# Patient Record
Sex: FEMALE | Race: WHITE | HISPANIC OR LATINO
[De-identification: names, ages, dates, MRNs, and addresses within clinical notes are randomized per-mention and may not be internally consistent; named-entity substitution may affect disease eponyms.]

---

## 2021-03-04 ENCOUNTER — APPOINTMENT (OUTPATIENT)
Dept: ULTRASOUND IMAGING | Facility: CLINIC | Age: 41
End: 2021-03-04
Payer: COMMERCIAL

## 2021-03-04 PROCEDURE — 76830 TRANSVAGINAL US NON-OB: CPT

## 2021-03-04 PROCEDURE — 76856 US EXAM PELVIC COMPLETE: CPT

## 2021-04-12 ENCOUNTER — NON-APPOINTMENT (OUTPATIENT)
Age: 41
End: 2021-04-12

## 2021-04-12 ENCOUNTER — APPOINTMENT (OUTPATIENT)
Dept: OBGYN | Facility: CLINIC | Age: 41
End: 2021-04-12
Payer: COMMERCIAL

## 2021-04-12 VITALS
DIASTOLIC BLOOD PRESSURE: 80 MMHG | HEIGHT: 60 IN | BODY MASS INDEX: 33.38 KG/M2 | WEIGHT: 170 LBS | SYSTOLIC BLOOD PRESSURE: 120 MMHG

## 2021-04-12 DIAGNOSIS — Z83.3 FAMILY HISTORY OF DIABETES MELLITUS: ICD-10-CM

## 2021-04-12 DIAGNOSIS — Z78.9 OTHER SPECIFIED HEALTH STATUS: ICD-10-CM

## 2021-04-12 DIAGNOSIS — Z86.39 PERSONAL HISTORY OF OTHER ENDOCRINE, NUTRITIONAL AND METABOLIC DISEASE: ICD-10-CM

## 2021-04-12 DIAGNOSIS — Z87.42 PERSONAL HISTORY OF OTHER DISEASES OF THE FEMALE GENITAL TRACT: ICD-10-CM

## 2021-04-12 DIAGNOSIS — Z82.3 FAMILY HISTORY OF STROKE: ICD-10-CM

## 2021-04-12 PROCEDURE — 99202 OFFICE O/P NEW SF 15 MIN: CPT

## 2021-04-12 PROCEDURE — 99072 ADDL SUPL MATRL&STAF TM PHE: CPT

## 2021-04-12 NOTE — HISTORY OF PRESENT ILLNESS
[FreeTextEntry1] : 42yo  with 2 months of vag bleeding. She has always had irreg periods since menarche. She was seen at [facility] and had an endo bx but does not have the results. She also believes she had a pap as well.  She had a sono at [facility] which demonstrates a 2cm R paraovarian cyst, as well as 15mm EMS.  She has not been soaking through pads.  She believes that she had a labs done by her PCP as well but is not sure if thyroid testing was done. \par \par

## 2021-04-12 NOTE — DISCUSSION/SUMMARY
[FreeTextEntry1] : 42yo  with AUB-? here for evaluation. s/p Endo bx and sono with thickened EMS. ? Polyp \par \par - Pt to RTO for 2wks for results review and possible EndoSee \par - GC/CT and BD affirm done to r/o STI \par - Bleeding precautions reviewed \par \par Joaquina Farrell MD \par Obstetrician/Gynecologist\par

## 2021-04-14 LAB
C TRACH RRNA SPEC QL NAA+PROBE: NOT DETECTED
N GONORRHOEA RRNA SPEC QL NAA+PROBE: NOT DETECTED
SOURCE AMPLIFICATION: NORMAL

## 2021-04-21 ENCOUNTER — APPOINTMENT (OUTPATIENT)
Age: 41
End: 2021-04-21
Payer: COMMERCIAL

## 2021-04-21 PROCEDURE — 77063 BREAST TOMOSYNTHESIS BI: CPT

## 2021-04-21 PROCEDURE — 77067 SCR MAMMO BI INCL CAD: CPT

## 2021-04-22 ENCOUNTER — NON-APPOINTMENT (OUTPATIENT)
Age: 41
End: 2021-04-22

## 2021-04-22 DIAGNOSIS — N76.0 ACUTE VAGINITIS: ICD-10-CM

## 2021-04-22 DIAGNOSIS — B96.89 ACUTE VAGINITIS: ICD-10-CM

## 2021-04-23 LAB
CANDIDA VAG CYTO: NOT DETECTED
G VAGINALIS+PREV SP MTYP VAG QL MICRO: DETECTED
T VAGINALIS VAG QL WET PREP: NOT DETECTED

## 2021-04-30 ENCOUNTER — APPOINTMENT (OUTPATIENT)
Dept: ULTRASOUND IMAGING | Facility: CLINIC | Age: 41
End: 2021-04-30
Payer: COMMERCIAL

## 2021-04-30 PROCEDURE — 76641 ULTRASOUND BREAST COMPLETE: CPT | Mod: 50

## 2021-05-03 ENCOUNTER — APPOINTMENT (OUTPATIENT)
Dept: OBGYN | Facility: CLINIC | Age: 41
End: 2021-05-03

## 2021-05-07 ENCOUNTER — APPOINTMENT (OUTPATIENT)
Dept: ULTRASOUND IMAGING | Facility: CLINIC | Age: 41
End: 2021-05-07
Payer: MEDICAID

## 2021-05-07 PROCEDURE — 76642 ULTRASOUND BREAST LIMITED: CPT | Mod: LT

## 2021-05-14 ENCOUNTER — APPOINTMENT (OUTPATIENT)
Dept: OBGYN | Facility: CLINIC | Age: 41
End: 2021-05-14
Payer: MEDICAID

## 2021-05-14 ENCOUNTER — ASOB RESULT (OUTPATIENT)
Age: 41
End: 2021-05-14

## 2021-05-14 VITALS
BODY MASS INDEX: 33.38 KG/M2 | HEIGHT: 60 IN | SYSTOLIC BLOOD PRESSURE: 112 MMHG | WEIGHT: 170 LBS | DIASTOLIC BLOOD PRESSURE: 70 MMHG

## 2021-05-14 VITALS — TEMPERATURE: 97.7 F

## 2021-05-14 DIAGNOSIS — N93.9 ABNORMAL UTERINE AND VAGINAL BLEEDING, UNSPECIFIED: ICD-10-CM

## 2021-05-14 LAB
HCG UR QL: NEGATIVE
QUALITY CONTROL: YES

## 2021-05-14 PROCEDURE — 99072 ADDL SUPL MATRL&STAF TM PHE: CPT

## 2021-05-14 PROCEDURE — 81025 URINE PREGNANCY TEST: CPT

## 2021-05-14 PROCEDURE — 58340 CATHETER FOR HYSTEROGRAPHY: CPT

## 2021-05-14 PROCEDURE — 76830 TRANSVAGINAL US NON-OB: CPT

## 2021-05-14 PROCEDURE — 76856 US EXAM PELVIC COMPLETE: CPT | Mod: 59

## 2021-05-25 LAB
BASOPHILS # BLD AUTO: 0.02 K/UL
BASOPHILS NFR BLD AUTO: 0.5 %
EOSINOPHIL # BLD AUTO: 0.07 K/UL
EOSINOPHIL NFR BLD AUTO: 1.7 %
HCT VFR BLD CALC: 35 %
HGB BLD-MCNC: 11.2 G/DL
IMM GRANULOCYTES NFR BLD AUTO: 0 %
LYMPHOCYTES # BLD AUTO: 1.73 K/UL
LYMPHOCYTES NFR BLD AUTO: 40.8 %
MAN DIFF?: NORMAL
MCHC RBC-ENTMCNC: 30.4 PG
MCHC RBC-ENTMCNC: 32 GM/DL
MCV RBC AUTO: 94.9 FL
MONOCYTES # BLD AUTO: 0.33 K/UL
MONOCYTES NFR BLD AUTO: 7.8 %
NEUTROPHILS # BLD AUTO: 2.09 K/UL
NEUTROPHILS NFR BLD AUTO: 49.2 %
PLATELET # BLD AUTO: 251 K/UL
RBC # BLD: 3.69 M/UL
RBC # FLD: 13.5 %
TSH SERPL-ACNC: 1.41 UIU/ML
WBC # FLD AUTO: 4.24 K/UL

## 2021-06-18 ENCOUNTER — OUTPATIENT (OUTPATIENT)
Dept: OUTPATIENT SERVICES | Facility: HOSPITAL | Age: 41
LOS: 1 days | End: 2021-06-18

## 2021-06-25 ENCOUNTER — APPOINTMENT (OUTPATIENT)
Dept: OBGYN | Facility: HOSPITAL | Age: 41
End: 2021-06-25

## 2021-06-25 ENCOUNTER — OUTPATIENT (OUTPATIENT)
Dept: OUTPATIENT SERVICES | Facility: HOSPITAL | Age: 41
LOS: 1 days | End: 2021-06-25
Payer: COMMERCIAL

## 2021-06-25 PROCEDURE — 58558 HYSTEROSCOPY BIOPSY: CPT

## 2021-07-16 ENCOUNTER — APPOINTMENT (OUTPATIENT)
Dept: OBGYN | Facility: CLINIC | Age: 41
End: 2021-07-16
Payer: MEDICAID

## 2021-07-16 VITALS
WEIGHT: 170 LBS | HEIGHT: 60 IN | SYSTOLIC BLOOD PRESSURE: 108 MMHG | BODY MASS INDEX: 33.38 KG/M2 | DIASTOLIC BLOOD PRESSURE: 66 MMHG

## 2021-07-16 VITALS — TEMPERATURE: 97.8 F

## 2021-07-16 PROCEDURE — 99212 OFFICE O/P EST SF 10 MIN: CPT

## 2021-07-20 ENCOUNTER — APPOINTMENT (OUTPATIENT)
Dept: DISASTER EMERGENCY | Facility: CLINIC | Age: 41
End: 2021-07-20

## 2021-10-13 ENCOUNTER — NON-APPOINTMENT (OUTPATIENT)
Age: 41
End: 2021-10-13

## 2021-10-13 NOTE — HISTORY OF PRESENT ILLNESS
[FreeTextEntry1] : 42yo  s/p polypectomy is here for post op visit. Pt is doing well post op and has no complaints. Her path is benign and was reviewed with her today.  She has had no bleeding since her procedure. \par \par  \par

## 2021-10-13 NOTE — DISCUSSION/SUMMARY
[FreeTextEntry1] : 42yo  s/p uncomplicated polypectomy, D&C with benign path and normal exam. \par \par RTO in 3 months if no menses \par Will attempt again to obtain pap results from [facility]\par Pt ok to resume activities\par All questions solicited and answered \par \par \par Joaquina Farrell MD \par Obstetrician/Gynecologist\par

## 2021-10-15 ENCOUNTER — APPOINTMENT (OUTPATIENT)
Dept: OBGYN | Facility: CLINIC | Age: 41
End: 2021-10-15
Payer: MEDICAID

## 2021-10-15 VITALS — TEMPERATURE: 97.1 F

## 2021-10-15 VITALS
SYSTOLIC BLOOD PRESSURE: 112 MMHG | HEIGHT: 60 IN | WEIGHT: 167 LBS | DIASTOLIC BLOOD PRESSURE: 82 MMHG | BODY MASS INDEX: 32.79 KG/M2

## 2021-10-15 PROCEDURE — 57500 BIOPSY OF CERVIX: CPT

## 2021-10-15 NOTE — PHYSICAL EXAM
[Normal] : uterus [No Bleeding] : there was no active vaginal bleeding [Polyp ___ cm] : had a [unfilled] ~Ucm polyp [Uterine Adnexae] : were not tender and not enlarged

## 2021-10-29 ENCOUNTER — APPOINTMENT (OUTPATIENT)
Dept: OBGYN | Facility: CLINIC | Age: 41
End: 2021-10-29

## 2021-11-01 ENCOUNTER — APPOINTMENT (OUTPATIENT)
Dept: OBGYN | Facility: CLINIC | Age: 41
End: 2021-11-01
Payer: MEDICAID

## 2021-11-01 VITALS
DIASTOLIC BLOOD PRESSURE: 74 MMHG | SYSTOLIC BLOOD PRESSURE: 114 MMHG | HEIGHT: 60 IN | WEIGHT: 178 LBS | BODY MASS INDEX: 34.95 KG/M2

## 2021-11-01 LAB
C TRACH RRNA SPEC QL NAA+PROBE: NOT DETECTED
CANDIDA VAG CYTO: NOT DETECTED
CORE LAB BIOPSY: NORMAL
G VAGINALIS+PREV SP MTYP VAG QL MICRO: NOT DETECTED
N GONORRHOEA RRNA SPEC QL NAA+PROBE: NOT DETECTED
SOURCE AMPLIFICATION: NORMAL
T VAGINALIS VAG QL WET PREP: NOT DETECTED

## 2021-11-01 PROCEDURE — 99212 OFFICE O/P EST SF 10 MIN: CPT

## 2021-11-01 NOTE — PHYSICAL EXAM
[Labia Majora] : normal [Labia Minora] : normal [Scant] : There was scant vaginal bleeding [Normal] : normal

## 2021-12-03 ENCOUNTER — APPOINTMENT (OUTPATIENT)
Dept: OBGYN | Facility: CLINIC | Age: 41
End: 2021-12-03
Payer: MEDICAID

## 2021-12-03 ENCOUNTER — ASOB RESULT (OUTPATIENT)
Age: 41
End: 2021-12-03

## 2021-12-03 VITALS
HEIGHT: 60 IN | DIASTOLIC BLOOD PRESSURE: 62 MMHG | SYSTOLIC BLOOD PRESSURE: 102 MMHG | BODY MASS INDEX: 32.59 KG/M2 | WEIGHT: 166 LBS

## 2021-12-03 LAB
HCG UR QL: NEGATIVE
QUALITY CONTROL: YES

## 2021-12-03 PROCEDURE — 76856 US EXAM PELVIC COMPLETE: CPT | Mod: 59

## 2021-12-03 PROCEDURE — 76830 TRANSVAGINAL US NON-OB: CPT

## 2021-12-03 PROCEDURE — 81025 URINE PREGNANCY TEST: CPT

## 2021-12-03 PROCEDURE — 58340 CATHETER FOR HYSTEROGRAPHY: CPT

## 2021-12-03 NOTE — DISCUSSION/SUMMARY
[FreeTextEntry1] : 42yo  s/p polypectomy in office with benign results and continued bleeding. Bleeding precautions reviewed \par \par - RTO ASAP for SHG and endo bx \par - All questions solicited and answered \par \par Joaquina Farrell MD \par Obstetrician/Gynecologist\par

## 2021-12-03 NOTE — HISTORY OF PRESENT ILLNESS
[FreeTextEntry1] : 42yo  s/p in office cervical polypectomy is here for follow up. Her polyp is benign. She reports unchanged  bleeding since her polypectomy (although it is only with wiping or a few spots on her underwear).    She has a benign endometrial polypectomy in July.

## 2021-12-03 NOTE — HISTORY OF PRESENT ILLNESS
[FreeTextEntry1] : 40yo  s/p in office cervical polypectomy is here for follow up. Her polyp is benign. She reports unchanged  bleeding since her polypectomy (although it is only with wiping or a few spots on her underwear).    She has a benign endometrial polypectomy in July.

## 2021-12-03 NOTE — DISCUSSION/SUMMARY
[FreeTextEntry1] : 40yo  s/p polypectomy in office with benign results and continued bleeding. Bleeding precautions reviewed \par \par - RTO ASAP for SHG and endo bx \par - All questions solicited and answered \par \par Joaquina Farrell MD \par Obstetrician/Gynecologist\par  urinary/bladder trouble/unable to urinate since this am

## 2022-02-08 ENCOUNTER — APPOINTMENT (OUTPATIENT)
Dept: ULTRASOUND IMAGING | Facility: CLINIC | Age: 42
End: 2022-02-08
Payer: MEDICAID

## 2022-02-08 PROCEDURE — 76642 ULTRASOUND BREAST LIMITED: CPT | Mod: LT

## 2022-02-15 ENCOUNTER — EMERGENCY (EMERGENCY)
Facility: HOSPITAL | Age: 42
LOS: 1 days | Discharge: HOME | End: 2022-02-15
Admitting: EMERGENCY MEDICINE
Payer: COMMERCIAL

## 2022-02-15 PROCEDURE — 93010 ELECTROCARDIOGRAM REPORT: CPT

## 2022-02-15 PROCEDURE — 72125 CT NECK SPINE W/O DYE: CPT | Mod: 26

## 2022-02-15 PROCEDURE — 99285 EMERGENCY DEPT VISIT HI MDM: CPT

## 2022-02-15 PROCEDURE — 70450 CT HEAD/BRAIN W/O DYE: CPT | Mod: 26

## 2022-02-16 DIAGNOSIS — M48.8X2 OTHER SPECIFIED SPONDYLOPATHIES, CERVICAL REGION: ICD-10-CM

## 2022-02-16 DIAGNOSIS — R51.9 HEADACHE, UNSPECIFIED: ICD-10-CM

## 2022-02-16 DIAGNOSIS — M54.2 CERVICALGIA: ICD-10-CM

## 2022-03-03 ENCOUNTER — APPOINTMENT (OUTPATIENT)
Dept: CARDIOLOGY | Facility: CLINIC | Age: 42
End: 2022-03-03
Payer: MEDICAID

## 2022-03-03 ENCOUNTER — APPOINTMENT (OUTPATIENT)
Dept: NEUROSURGERY | Facility: CLINIC | Age: 42
End: 2022-03-03
Payer: MEDICAID

## 2022-03-03 VITALS — SYSTOLIC BLOOD PRESSURE: 106 MMHG | DIASTOLIC BLOOD PRESSURE: 60 MMHG

## 2022-03-03 VITALS
HEART RATE: 74 BPM | WEIGHT: 168 LBS | SYSTOLIC BLOOD PRESSURE: 110 MMHG | HEIGHT: 60 IN | BODY MASS INDEX: 32.98 KG/M2 | DIASTOLIC BLOOD PRESSURE: 62 MMHG | OXYGEN SATURATION: 99 %

## 2022-03-03 VITALS
HEART RATE: 71 BPM | TEMPERATURE: 97.7 F | DIASTOLIC BLOOD PRESSURE: 72 MMHG | SYSTOLIC BLOOD PRESSURE: 123 MMHG | WEIGHT: 168 LBS | HEIGHT: 60 IN | BODY MASS INDEX: 32.98 KG/M2

## 2022-03-03 DIAGNOSIS — Z83.438 FAMILY HISTORY OF OTHER DISORDER OF LIPOPROTEIN METABOLISM AND OTHER LIPIDEMIA: ICD-10-CM

## 2022-03-03 DIAGNOSIS — Z82.3 FAMILY HISTORY OF STROKE: ICD-10-CM

## 2022-03-03 DIAGNOSIS — Z78.9 OTHER SPECIFIED HEALTH STATUS: ICD-10-CM

## 2022-03-03 DIAGNOSIS — R42 DIZZINESS AND GIDDINESS: ICD-10-CM

## 2022-03-03 PROCEDURE — 99203 OFFICE O/P NEW LOW 30 MIN: CPT

## 2022-03-03 PROCEDURE — 99204 OFFICE O/P NEW MOD 45 MIN: CPT

## 2022-03-03 RX ORDER — MEDROXYPROGESTERONE ACETATE 10 MG/1
10 TABLET ORAL DAILY
Qty: 30 | Refills: 1 | Status: DISCONTINUED | COMMUNITY
Start: 2021-05-14 | End: 2022-03-03

## 2022-03-03 RX ORDER — METRONIDAZOLE 500 MG/1
500 TABLET ORAL TWICE DAILY
Qty: 14 | Refills: 0 | Status: DISCONTINUED | COMMUNITY
Start: 2021-04-22 | End: 2022-03-03

## 2022-03-03 NOTE — PLAN
[FreeTextEntry1] : In summary this delightful 42-year-old female has had improving neck pain over the past month.  She has a CT scan which is significant OPLL.  I would like to get an MRI of the cervical spine to further investigate the findings on CT scan to rule out any cervical spinal cord compression.  I am happy to see the patient symptoms are slightly improving\par If there is no significant cervical spinal cord compression seen on MRI I would favor conservative management as well as long-term observation which she can do with her primary care doctor and her neurologist.  She of course can see us in the future if she has further issues.  In the meantime the patient will follow up with me after MRI is completed

## 2022-03-03 NOTE — RESULTS/DATA
[FreeTextEntry1] : CT of the cervical spine shows ossification of posterior longitudinal ligament most significant C2-C3 as well as some of the inferior levels.  Does not appear to be any significant canal compromise.

## 2022-03-03 NOTE — DISCUSSION/SUMMARY
[FreeTextEntry1] : 1. Abnormal ECG: with RBBB. Recommend echocardiogram to evaluate for structural heart disease. \par \par Office will call with results. \par \par Follow up in 1 year.

## 2022-03-03 NOTE — HISTORY OF PRESENT ILLNESS
[FreeTextEntry1] : 42 year old [place] [facility] [place], with headaches, neck pain, and dizziness. Had imaging of her cervical spine which showed some disc disease. Patient given Toradol and symptoms improved. An ECG revealed RBBB. Patient never seen cardiology prior. Patient has no chest pain, SOB, or palpitations.\par \par There is no history of MI, CVA, CHF, or previous coronary intervention.\par

## 2022-03-03 NOTE — REVIEW OF SYSTEMS
[Joint Pain] : joint pain [Joint Stiffness] : joint stiffness [Dizziness] : dizziness [Negative] : Gastrointestinal [FreeTextEntry5] : see HPI [FreeTextEntry9] : see HPI [de-identified] : see HPI

## 2022-03-03 NOTE — HISTORY OF PRESENT ILLNESS
[FreeTextEntry1] : Neck pain  [de-identified] : this is a 42-year-old female presents today by referral of the emergency room.  She went there for severe neck pain approximately 1 month long.  It is steadily been improving as she has been provided medications.  It originally was starting from the back of her neck and radiating to the back of her head causing severe headaches.  That has somewhat subsided and is now generally located in her neck.  She denies any numbness tingling pain in her arms any weakness any bladder bowel dysfunction.  She does not have a recent treatment other than the medication.  She is been taking Tylenol and anti-inflammatories

## 2022-03-03 NOTE — PHYSICAL EXAM
[Normal] : moves all extremities, no focal deficits, normal speech [de-identified] : No carotid bruits auscultated bilaterally

## 2022-03-18 ENCOUNTER — APPOINTMENT (OUTPATIENT)
Dept: CARDIOLOGY | Facility: CLINIC | Age: 42
End: 2022-03-18
Payer: MEDICAID

## 2022-03-18 PROCEDURE — 93306 TTE W/DOPPLER COMPLETE: CPT

## 2022-03-22 ENCOUNTER — NON-APPOINTMENT (OUTPATIENT)
Age: 42
End: 2022-03-22

## 2022-05-09 DIAGNOSIS — M54.2 CERVICALGIA: ICD-10-CM

## 2022-06-01 ENCOUNTER — APPOINTMENT (OUTPATIENT)
Dept: NEUROSURGERY | Facility: CLINIC | Age: 42
End: 2022-06-01
Payer: MEDICAID

## 2022-06-01 VITALS
SYSTOLIC BLOOD PRESSURE: 120 MMHG | WEIGHT: 168 LBS | BODY MASS INDEX: 32.98 KG/M2 | HEIGHT: 60 IN | DIASTOLIC BLOOD PRESSURE: 76 MMHG | TEMPERATURE: 98.2 F

## 2022-06-01 DIAGNOSIS — M54.2 CERVICALGIA: ICD-10-CM

## 2022-06-01 DIAGNOSIS — R51.9 HEADACHE, UNSPECIFIED: ICD-10-CM

## 2022-06-01 DIAGNOSIS — M48.8X9 OTHER SPECIFIED SPONDYLOPATHIES, SITE UNSPECIFIED: ICD-10-CM

## 2022-06-01 PROCEDURE — 99213 OFFICE O/P EST LOW 20 MIN: CPT

## 2022-06-01 NOTE — HISTORY OF PRESENT ILLNESS
[FreeTextEntry1] : 42-year-old female follows up today in regards to previous complaints of neck pain and headache.  She unfortunately was not approved for the MRI previously.  She has now completed a course of physical therapy which has provided some relief regards her neck pain.  She denies any numbness tingling weakness of bladder bowel dysfunction.  She continues take anti-inflammatories as previously prescribed

## 2022-06-01 NOTE — ASSESSMENT
[FreeTextEntry1] : Again this is a 42-year-old female complains of neck pain.  She has failed a conservative trial management which includes physical therapy and anti-inflammatories as well as giving a reasonable amount of time greater than 6 to 12 weeks.  She has significant's findings on CT of the cervical spine showing OPLL C2-C3.  Therefore it is indicated to obtain an MRI of the cervical spine to rule out spinal cord compression

## 2022-06-08 ENCOUNTER — APPOINTMENT (OUTPATIENT)
Dept: MRI IMAGING | Facility: CLINIC | Age: 42
End: 2022-06-08
Payer: MEDICAID

## 2022-06-08 PROCEDURE — 72141 MRI NECK SPINE W/O DYE: CPT

## 2022-09-08 ENCOUNTER — OUTPATIENT (OUTPATIENT)
Dept: OUTPATIENT SERVICES | Facility: HOSPITAL | Age: 42
LOS: 1 days | End: 2022-09-08

## 2022-09-12 ENCOUNTER — OUTPATIENT (OUTPATIENT)
Dept: OUTPATIENT SERVICES | Facility: HOSPITAL | Age: 42
LOS: 1 days | End: 2022-09-12

## 2022-09-14 DIAGNOSIS — G56.03 CARPAL TUNNEL SYNDROME, BILATERAL UPPER LIMBS: ICD-10-CM

## 2022-09-14 DIAGNOSIS — Z01.812 ENCOUNTER FOR PREPROCEDURAL LABORATORY EXAMINATION: ICD-10-CM

## 2022-09-19 DIAGNOSIS — G56.03 CARPAL TUNNEL SYNDROME, BILATERAL UPPER LIMBS: ICD-10-CM

## 2022-09-19 DIAGNOSIS — E66.01 MORBID (SEVERE) OBESITY DUE TO EXCESS CALORIES: ICD-10-CM

## 2022-09-22 ENCOUNTER — APPOINTMENT (OUTPATIENT)
Dept: MAMMOGRAPHY | Facility: CLINIC | Age: 42
End: 2022-09-22

## 2022-09-22 PROCEDURE — 77063 BREAST TOMOSYNTHESIS BI: CPT

## 2022-09-22 PROCEDURE — 77067 SCR MAMMO BI INCL CAD: CPT

## 2023-01-31 ENCOUNTER — APPOINTMENT (OUTPATIENT)
Dept: OBGYN | Facility: CLINIC | Age: 43
End: 2023-01-31
Payer: MEDICAID

## 2023-01-31 VITALS
SYSTOLIC BLOOD PRESSURE: 136 MMHG | DIASTOLIC BLOOD PRESSURE: 85 MMHG | BODY MASS INDEX: 32.79 KG/M2 | HEIGHT: 60 IN | WEIGHT: 167 LBS

## 2023-01-31 DIAGNOSIS — Z00.00 ENCOUNTER FOR GENERAL ADULT MEDICAL EXAMINATION W/OUT ABNORMAL FINDINGS: ICD-10-CM

## 2023-01-31 LAB
HCG UR QL: NEGATIVE
QUALITY CONTROL: YES

## 2023-01-31 PROCEDURE — 81025 URINE PREGNANCY TEST: CPT

## 2023-01-31 PROCEDURE — 99212 OFFICE O/P EST SF 10 MIN: CPT

## 2023-02-01 ENCOUNTER — APPOINTMENT (OUTPATIENT)
Dept: ANTEPARTUM | Facility: CLINIC | Age: 43
End: 2023-02-01
Payer: MEDICAID

## 2023-02-01 ENCOUNTER — ASOB RESULT (OUTPATIENT)
Age: 43
End: 2023-02-01

## 2023-02-01 PROCEDURE — 76856 US EXAM PELVIC COMPLETE: CPT | Mod: 59

## 2023-02-01 PROCEDURE — 76830 TRANSVAGINAL US NON-OB: CPT

## 2023-02-03 LAB
ESTIMATED AVERAGE GLUCOSE: 91 MG/DL
ESTRADIOL SERPL-MCNC: 45 PG/ML
HBA1C MFR BLD HPLC: 4.8 %
HPV HIGH+LOW RISK DNA PNL CVX: NOT DETECTED

## 2023-02-07 LAB
A VAGINAE DNA VAG QL NAA+PROBE: NORMAL
ANDROST SERPL-MCNC: 42 NG/DL
BASOPHILS # BLD AUTO: 0.01 K/UL
BASOPHILS NFR BLD AUTO: 0.2 %
BVAB2 DNA VAG QL NAA+PROBE: NORMAL
C KRUSEI DNA VAG QL NAA+PROBE: NEGATIVE
C TRACH RRNA SPEC QL NAA+PROBE: NEGATIVE
EOSINOPHIL # BLD AUTO: 0.08 K/UL
EOSINOPHIL NFR BLD AUTO: 1.9 %
FSH SERPL-MCNC: 9.9 IU/L
HCG SERPL-MCNC: <1 MIU/ML
HCT VFR BLD CALC: 37.8 %
HGB BLD-MCNC: 12.9 G/DL
IMM GRANULOCYTES NFR BLD AUTO: 0.2 %
LH SERPL-ACNC: 6.9 IU/L
LYMPHOCYTES # BLD AUTO: 1.66 K/UL
LYMPHOCYTES NFR BLD AUTO: 38.9 %
MAN DIFF?: NORMAL
MCHC RBC-ENTMCNC: 31.3 PG
MCHC RBC-ENTMCNC: 34.1 GM/DL
MCV RBC AUTO: 91.7 FL
MEGA1 DNA VAG QL NAA+PROBE: NORMAL
MONOCYTES # BLD AUTO: 0.29 K/UL
MONOCYTES NFR BLD AUTO: 6.8 %
N GONORRHOEA RRNA SPEC QL NAA+PROBE: NEGATIVE
NEUTROPHILS # BLD AUTO: 2.22 K/UL
NEUTROPHILS NFR BLD AUTO: 52 %
PLATELET # BLD AUTO: 217 K/UL
PROGEST SERPL-MCNC: <0.1 NG/ML
PROLACTIN SERPL-MCNC: 4.4 NG/ML
RBC # BLD: 4.12 M/UL
RBC # FLD: 13.2 %
T VAGINALIS RRNA SPEC QL NAA+PROBE: NEGATIVE
TESTOST FREE SERPL-MCNC: 1.5 PG/ML
TESTOST SERPL-MCNC: 6.3 NG/DL
TSH SERPL-ACNC: 1.66 UIU/ML
WBC # FLD AUTO: 4.27 K/UL

## 2023-02-08 ENCOUNTER — APPOINTMENT (OUTPATIENT)
Dept: CARDIOLOGY | Facility: CLINIC | Age: 43
End: 2023-02-08
Payer: MEDICAID

## 2023-02-08 ENCOUNTER — NON-APPOINTMENT (OUTPATIENT)
Age: 43
End: 2023-02-08

## 2023-02-08 VITALS
OXYGEN SATURATION: 99 % | DIASTOLIC BLOOD PRESSURE: 70 MMHG | HEIGHT: 60 IN | SYSTOLIC BLOOD PRESSURE: 114 MMHG | BODY MASS INDEX: 32.2 KG/M2 | HEART RATE: 66 BPM | TEMPERATURE: 97.1 F | WEIGHT: 164 LBS

## 2023-02-08 DIAGNOSIS — E78.1 PURE HYPERGLYCERIDEMIA: ICD-10-CM

## 2023-02-08 DIAGNOSIS — Z87.19 PERSONAL HISTORY OF OTHER DISEASES OF THE DIGESTIVE SYSTEM: ICD-10-CM

## 2023-02-08 PROCEDURE — 93000 ELECTROCARDIOGRAM COMPLETE: CPT

## 2023-02-08 PROCEDURE — 99214 OFFICE O/P EST MOD 30 MIN: CPT | Mod: 25

## 2023-02-08 RX ORDER — IBUPROFEN 600 MG/1
600 TABLET, FILM COATED ORAL
Qty: 20 | Refills: 0 | Status: DISCONTINUED | COMMUNITY
Start: 2022-02-15 | End: 2023-02-08

## 2023-02-08 RX ORDER — MINERAL OIL, PETROLATUM 425; 568 MG/G; MG/G
OINTMENT OPHTHALMIC
Qty: 4 | Refills: 0 | Status: DISCONTINUED | COMMUNITY
Start: 2022-04-20 | End: 2023-02-08

## 2023-02-08 RX ORDER — CYCLOBENZAPRINE HYDROCHLORIDE 10 MG/1
10 TABLET, FILM COATED ORAL
Qty: 10 | Refills: 0 | Status: DISCONTINUED | COMMUNITY
Start: 2022-02-15 | End: 2023-02-08

## 2023-02-08 RX ORDER — POLYVINYL ALCOHOL 14 MG/ML
1.4 SOLUTION/ DROPS OPHTHALMIC
Qty: 15 | Refills: 0 | Status: DISCONTINUED | COMMUNITY
Start: 2022-04-19 | End: 2023-02-08

## 2023-02-08 NOTE — PHYSICAL EXAM
[Normal] : moves all extremities, no focal deficits, normal speech [de-identified] : No carotid bruits auscultated bilaterally

## 2023-02-08 NOTE — REVIEW OF SYSTEMS
[Joint Pain] : joint pain [Joint Stiffness] : joint stiffness [Dizziness] : dizziness [Negative] : Gastrointestinal [FreeTextEntry5] : see HPI [FreeTextEntry9] : see HPI [de-identified] : see HPI

## 2023-02-08 NOTE — HISTORY OF PRESENT ILLNESS
[FreeTextEntry1] : \par 42 year old female presented to [facility] [place], 2/15/2022, with headaches, neck pain, and dizziness. Had imaging of her cervical spine which showed some disc disease. Patient given Toradol and symptoms improved. An ECG revealed RBBB. Patient never seen cardiology prior. Patient has no chest pain, SOB, or palpitations. There is no history of MI, CVA, CHF, or previous coronary intervention.\par \par 2/2023 VISIT: abnormal uterine bleeding. mildly elevated triglycerides. bilateral carpal tunnel she reports procedures and improved dramatically. no angina or dyspnea.\par \par \par TESTING I REVIEWED TODAY:

## 2023-02-08 NOTE — CARDIOLOGY SUMMARY
[de-identified] : 02/15/2022, MARINE, DAYA [de-identified] : 3/2022: structurally well  [de-identified] : \par 1/2023 LABWORK:a1c 4.8. normal tft. STANFORD 1:160. mild transaminitis. normal cbc. . Trig 163. \par

## 2023-02-08 NOTE — DISCUSSION/SUMMARY
[FreeTextEntry1] : \par # RBBB: structurally unremarkable TTE\par - follow clinically\par \par # Obesity:\par -  diet/weight loss/lifestyle optimization. Mediterranean diet. consider sleep study in future\par \par # Mildly elevated triglycerides: follow serially.  diet/weight loss/lifestyle optimization. Mediterranean diet. no indication for pharmacologic therapy presently. \par \par # Cervical stenosis: noted NSGY notes.\par \par # Uterine bleeding: noted GYN notes.\par \par # Abnormal STANFORD: I am unsure of significance. PCP follow up. \par \par \par Call with lab results. Follow in 1 year.  ER precautions given to patient.\par

## 2023-02-09 ENCOUNTER — APPOINTMENT (OUTPATIENT)
Dept: OBGYN | Facility: CLINIC | Age: 43
End: 2023-02-09

## 2023-02-09 LAB
17OHP SERPL-MCNC: 20 NG/DL
DHEA-SULFATE, SERUM: 30 UG/DL

## 2023-02-14 ENCOUNTER — APPOINTMENT (OUTPATIENT)
Dept: OBGYN | Facility: CLINIC | Age: 43
End: 2023-02-14
Payer: MEDICAID

## 2023-02-14 VITALS
HEIGHT: 60 IN | BODY MASS INDEX: 34.75 KG/M2 | DIASTOLIC BLOOD PRESSURE: 83 MMHG | SYSTOLIC BLOOD PRESSURE: 122 MMHG | WEIGHT: 177 LBS

## 2023-02-14 PROCEDURE — 81025 URINE PREGNANCY TEST: CPT

## 2023-02-14 PROCEDURE — 58100 BIOPSY OF UTERUS LINING: CPT

## 2023-02-14 PROCEDURE — 99213 OFFICE O/P EST LOW 20 MIN: CPT | Mod: NC,25

## 2023-02-14 PROCEDURE — T1013: CPT

## 2023-02-14 NOTE — HISTORY OF PRESENT ILLNESS
[FreeTextEntry1] : 43yo  LMP  not on BCM is here for results review. Her pap is unsatisfactory and will need to be repeated today.  Her Sono demonstrates a 13mm with fluid in the canal aith b/l simple ov cysts.  \par \par Her labs were wnl

## 2023-02-14 NOTE — PROCEDURE
[Cervical Pap Smear] : cervical Pap smear [Liquid Base] : liquid base [Endometrial Biopsy] : Endometrial biopsy [Time out performed] : Pre-procedure time out performed.  Patient's name, date of birth and procedure confirmed. [Consent Obtained] : Consent obtained [Thickened Endometrium] : thickened endometrium [Risks] : risks [Benefits] : benefits [Alternatives] : alternatives [Patient] : patient [Infection] : infection [Bleeding] : bleeding [Allergic Reaction] : allergic reaction [Uterine Perforation] : uterine perforation [Negative] : negative pregnancy test [No Premedication] : No premedication [None] : none [Tenaculum] : Tenaculum [Easy Passage] : Easy passage [Sounded to ___ cm] : sounded to [unfilled] ~Ucm [Abundant] : abundant [Sent to Pathology] : placed in buffered formalin and sent for pathology [Tolerated Well] : Patient tolerated the procedure well [No Complications] : No complications

## 2023-02-14 NOTE — REASON FOR VISIT
[Follow-Up] : a follow-up evaluation of [Time Spent: ____ minutes] : Total time spent using  services: [unfilled] minutes. The patient's primary language is not English thus required  services. [Interpreters_IDNumber] : [id number] [Interpreters_FullName] : Dilip  [TWNoteComboBox1] : Croatian

## 2023-02-14 NOTE — DISCUSSION/SUMMARY
[FreeTextEntry1] : 41yo  LMP  not on BCM with b// ov cysts, thickened EMS and AUB (h/o polyps). Unsatisfactory pap repeated today \par \par Thickened EMS with fluid: \par - Embx with abdunant tissue and fluid did today \par - Discussed Mirena IUD placement. Pt to RTO in 1 wk for placement \par - Strict precautions and aftercare instructions reviewed \par - Aware she cannot have anything in vagina or be submerged in water until seen by me \par - RTO in 1 wks for follow up and results \par \par b/l ovarian cysts: \par - TVUS and MD visit in 4 wks\par - Torsion and rupture precautions \par \par Joaquina Crespo MD \par Obstetrician/Gynecologist\par \par \par

## 2023-02-15 ENCOUNTER — NON-APPOINTMENT (OUTPATIENT)
Age: 43
End: 2023-02-15

## 2023-02-15 LAB
CYTOLOGY CVX/VAG DOC THIN PREP: ABNORMAL
HPV HIGH+LOW RISK DNA PNL CVX: NOT DETECTED

## 2023-02-20 LAB — CYTOLOGY CVX/VAG DOC THIN PREP: ABNORMAL

## 2023-02-24 ENCOUNTER — APPOINTMENT (OUTPATIENT)
Dept: OBGYN | Facility: CLINIC | Age: 43
End: 2023-02-24
Payer: MEDICAID

## 2023-02-24 VITALS
SYSTOLIC BLOOD PRESSURE: 110 MMHG | DIASTOLIC BLOOD PRESSURE: 62 MMHG | BODY MASS INDEX: 32.98 KG/M2 | WEIGHT: 168 LBS | HEIGHT: 60 IN

## 2023-02-24 LAB
CORE LAB BIOPSY: NORMAL
HCG UR QL: NEGATIVE
QUALITY CONTROL: YES

## 2023-02-24 PROCEDURE — 99212 OFFICE O/P EST SF 10 MIN: CPT

## 2023-02-24 PROCEDURE — 81025 URINE PREGNANCY TEST: CPT

## 2023-02-24 PROCEDURE — 58300 INSERT INTRAUTERINE DEVICE: CPT

## 2023-02-24 NOTE — REASON FOR VISIT
[Follow-Up] : a follow-up evaluation of [Other: ______] : provided by MARTHA [Time Spent: ____ minutes] : Total time spent using  services: [unfilled] minutes. The patient's primary language is not English thus required  services. [Interpreters_IDNumber] : [id number] [Interpreters_FullName] : Dana [TWNoteComboBox1] : Malawian

## 2023-02-24 NOTE — HISTORY OF PRESENT ILLNESS
[FreeTextEntry1] : 43yo  LMP  not on BCM is here for results review and Mirena IUD insertion. Pt had an EMBx which demonstrated a disordered proliferative endometrium.  She desires an IUD today.

## 2023-02-24 NOTE — DISCUSSION/SUMMARY
[FreeTextEntry1] : 41yo  LMP  with neg UCG today s/p uncomplicated Mirena IUD insertion\par \par - GC/CT sent off urine today \par - Strict precautions and aftercare instructions reviewed \par - Aware she cannot have anything in vagina or be submerged in water until seen by me \par - RTO in 6 wks for follow up sono and string check \par \par \par Joaquina Crespo MD \par Obstetrician/Gynecologist\par

## 2023-02-24 NOTE — PROCEDURE
[IUD Placement] : intrauterine device (IUD) placement [Time out performed] : Pre-procedure time out performed.  Patient's name, date of birth and procedure confirmed. [Consent Obtained] : Consent obtained [Prevention of Pregnancy] : prevention of pregnancy [Abnormal Uterine Bleeding] : abnormal uterine bleeding [Risks] : risks [Benefits] : benefits [Alternatives] : alternatives [Patient] : patient [Infection] : infection [Bleeding] : bleeding [Pain] : pain [Expulsion] : expulsion [Failure] : failure [Uterine Perforation] : uterine perforation [Neg Pregnancy Test] : negative pregnancy test [No Premedication] : No premedication [Tenaculum] : Tenaculum [Easy Passage] : Easy passage [Sounded to ___ cm] : sounded to [unfilled] ~Ucm [Mirena IUD] : Mirena IUD [Tolerated Well] : Patient tolerated the procedure well [No Complications] : No complications [None] : None [de-identified] : [id number] [de-identified] : 2025/Feb  [de-identified] : 2028/Feb

## 2023-04-10 DIAGNOSIS — Z97.5 PRESENCE OF (INTRAUTERINE) CONTRACEPTIVE DEVICE: ICD-10-CM

## 2023-04-11 ENCOUNTER — APPOINTMENT (OUTPATIENT)
Dept: OBGYN | Facility: CLINIC | Age: 43
End: 2023-04-11

## 2023-04-11 ENCOUNTER — APPOINTMENT (OUTPATIENT)
Dept: ANTEPARTUM | Facility: CLINIC | Age: 43
End: 2023-04-11

## 2023-04-18 ENCOUNTER — APPOINTMENT (OUTPATIENT)
Dept: ULTRASOUND IMAGING | Facility: CLINIC | Age: 43
End: 2023-04-18
Payer: SELF-PAY

## 2023-04-18 PROCEDURE — 76830 TRANSVAGINAL US NON-OB: CPT

## 2023-05-04 ENCOUNTER — NON-APPOINTMENT (OUTPATIENT)
Age: 43
End: 2023-05-04

## 2023-06-09 ENCOUNTER — NON-APPOINTMENT (OUTPATIENT)
Age: 43
End: 2023-06-09

## 2023-06-09 DIAGNOSIS — N83.201 UNSPECIFIED OVARIAN CYST, RIGHT SIDE: ICD-10-CM

## 2023-07-12 ENCOUNTER — APPOINTMENT (OUTPATIENT)
Dept: ULTRASOUND IMAGING | Facility: CLINIC | Age: 43
End: 2023-07-12
Payer: SELF-PAY

## 2023-07-12 PROCEDURE — 76830 TRANSVAGINAL US NON-OB: CPT

## 2024-01-31 ENCOUNTER — NON-APPOINTMENT (OUTPATIENT)
Age: 44
End: 2024-01-31

## 2024-02-12 ENCOUNTER — APPOINTMENT (OUTPATIENT)
Dept: MAMMOGRAPHY | Facility: CLINIC | Age: 44
End: 2024-02-12
Payer: COMMERCIAL

## 2024-02-12 PROCEDURE — 77063 BREAST TOMOSYNTHESIS BI: CPT

## 2024-02-12 PROCEDURE — 77067 SCR MAMMO BI INCL CAD: CPT

## 2024-02-22 LAB — HBA1C MFR BLD HPLC: 5.1

## 2024-02-27 PROBLEM — R94.31 ABNORMAL ECG: Status: ACTIVE | Noted: 2022-03-03

## 2024-02-27 PROBLEM — Z78.9 NON-SMOKER: Status: ACTIVE | Noted: 2021-04-12

## 2024-02-28 ENCOUNTER — APPOINTMENT (OUTPATIENT)
Dept: CARDIOLOGY | Facility: CLINIC | Age: 44
End: 2024-02-28
Payer: COMMERCIAL

## 2024-02-28 VITALS
HEIGHT: 60 IN | DIASTOLIC BLOOD PRESSURE: 64 MMHG | HEART RATE: 86 BPM | BODY MASS INDEX: 33.38 KG/M2 | OXYGEN SATURATION: 99 % | WEIGHT: 170 LBS | SYSTOLIC BLOOD PRESSURE: 106 MMHG

## 2024-02-28 DIAGNOSIS — R94.31 ABNORMAL ELECTROCARDIOGRAM [ECG] [EKG]: ICD-10-CM

## 2024-02-28 DIAGNOSIS — Z78.9 OTHER SPECIFIED HEALTH STATUS: ICD-10-CM

## 2024-02-28 DIAGNOSIS — R07.89 OTHER CHEST PAIN: ICD-10-CM

## 2024-02-28 PROCEDURE — 93000 ELECTROCARDIOGRAM COMPLETE: CPT

## 2024-02-28 PROCEDURE — 99214 OFFICE O/P EST MOD 30 MIN: CPT

## 2024-02-28 NOTE — REVIEW OF SYSTEMS
[Joint Pain] : joint pain [Joint Stiffness] : joint stiffness [Dizziness] : dizziness [Negative] : Gastrointestinal [FreeTextEntry5] : see HPI [de-identified] : see HPI [FreeTextEntry9] : see HPI

## 2024-02-28 NOTE — DISCUSSION/SUMMARY
[FreeTextEntry1] : # RBBB: order repeat echo given atypical chest pain and nonspecific ekg changes over 2 years   # Obesity and mildly elevated triglycerides: follow serially. diet/weight loss/lifestyle optimization. Mediterranean diet. no indication for pharmacologic therapy presently. jelena evaluation in future once baseline weight.   # Cervical stenosis: noted NSGY notes.  # Uterine bleeding: noted GYN notes.  # Abnormal STANFORD: I am unsure of significance. PCP follow up.   Follow after testing. ER precautions given to patient.

## 2024-02-28 NOTE — CARDIOLOGY SUMMARY
[de-identified] : 02/15/2022, MARINE, DAYA [de-identified] : 3/2022: structurally well  [de-identified] : \par  1/2023 LABWORK:a1c 4.8. normal tft. STANFORD 1:160. mild transaminitis. normal cbc. . Trig 163. \par

## 2024-02-28 NOTE — PHYSICAL EXAM
[Normal] : moves all extremities, no focal deficits, normal speech [de-identified] : No carotid bruits auscultated bilaterally

## 2024-02-28 NOTE — HISTORY OF PRESENT ILLNESS
[FreeTextEntry1] : 44 year old female presented [place] [facility] [place], with headaches, neck pain, and dizziness. Had imaging of her cervical spine which showed some disc disease. Patient given Toradol and symptoms improved. An ECG revealed RBBB. Patient never seen cardiology prior. There is no history of MI, CVA, CHF, or previous coronary intervention.  2/2024 VISIT: ekg nonspecific changes. 2 months feeling pain when breathing randomly. recent cbc/cmp/a1c. triglycerides 174. LDL improved from 101 to 75.   2/2023 VISIT: abnormal uterine bleeding. mildly elevated triglycerides. bilateral carpal tunnel she reports procedures and improved dramatically. no angina or dyspnea.   TESTING I REVIEWED TODAY: excluding above

## 2024-03-11 ENCOUNTER — APPOINTMENT (OUTPATIENT)
Dept: CARDIOLOGY | Facility: CLINIC | Age: 44
End: 2024-03-11

## 2024-03-18 ENCOUNTER — APPOINTMENT (OUTPATIENT)
Dept: CARDIOLOGY | Facility: CLINIC | Age: 44
End: 2024-03-18

## 2024-03-18 ENCOUNTER — APPOINTMENT (OUTPATIENT)
Dept: OBGYN | Facility: CLINIC | Age: 44
End: 2024-03-18

## 2024-03-27 ENCOUNTER — APPOINTMENT (OUTPATIENT)
Dept: CARDIOLOGY | Facility: CLINIC | Age: 44
End: 2024-03-27

## 2024-05-30 ENCOUNTER — APPOINTMENT (OUTPATIENT)
Dept: OBGYN | Facility: CLINIC | Age: 44
End: 2024-05-30
Payer: MEDICAID

## 2024-05-30 VITALS
BODY MASS INDEX: 33.38 KG/M2 | DIASTOLIC BLOOD PRESSURE: 72 MMHG | HEIGHT: 60 IN | WEIGHT: 170 LBS | SYSTOLIC BLOOD PRESSURE: 109 MMHG

## 2024-05-30 DIAGNOSIS — Z01.419 ENCOUNTER FOR GYNECOLOGICAL EXAMINATION (GENERAL) (ROUTINE) W/OUT ABNORMAL FINDINGS: ICD-10-CM

## 2024-05-30 DIAGNOSIS — N81.10 CYSTOCELE, UNSPECIFIED: ICD-10-CM

## 2024-05-30 PROCEDURE — 99213 OFFICE O/P EST LOW 20 MIN: CPT | Mod: 25

## 2024-05-30 PROCEDURE — 99396 PREV VISIT EST AGE 40-64: CPT

## 2024-05-30 NOTE — PHYSICAL EXAM
[Appropriately responsive] : appropriately responsive [Alert] : alert [No Acute Distress] : no acute distress [No Lymphadenopathy] : no lymphadenopathy [Regular Rate Rhythm] : regular rate rhythm [No Murmurs] : no murmurs [Clear to Auscultation B/L] : clear to auscultation bilaterally [Soft] : soft [Non-tender] : non-tender [Non-distended] : non-distended [No HSM] : No HSM [No Lesions] : no lesions [No Mass] : no mass [Oriented x3] : oriented x3 [Labia Majora] : normal [Labia Minora] : normal [IUD String] : an IUD string was noted [Normal] : normal [Uterine Adnexae] : normal [Cystocele] : a cystocele

## 2024-05-30 NOTE — HISTORY OF PRESENT ILLNESS
[Patient reported PAP Smear was normal] : Patient reported PAP Smear was normal [TextBox_4] : 45yo  for well woman exam.  Sexually active s/pBTL. TVUS for menorrhagia 2023 - 13 mm EML with free fluid, bilateral ovarian cysts.  Mirena placed . Repeat TVUS  shows normal EML, corpus luteal cyst left ovaria, small paraovarian cyst right ovary. [PapSmeardate] : 2/23 2/23

## 2024-05-30 NOTE — DISCUSSION/SUMMARY
[FreeTextEntry1] : Unremarkable CBE and SBE reviewed Mammogram ordered Pelvic exam significant for Grade 1 cystocele - referred to Dr. Sahni Pap/HPV collected I reviewed practices to support bone health including Vitamin D3 (2000 IU) and calcium rich foods with limitation on calcium supplementation by tabular form to 600 MG by mouth daily, a minimum of 30 minutes of weight bearing exercise at least 3 x weekly and limited daily sun exposure, 10-15 minutes.  Healthy diet, exercise and sleep hygiene discussed The importance of a screening colonoscopy was discussed.

## 2024-06-02 LAB — HPV HIGH+LOW RISK DNA PNL CVX: NOT DETECTED

## 2024-06-04 LAB — CYTOLOGY CVX/VAG DOC THIN PREP: NORMAL

## 2024-07-11 ENCOUNTER — APPOINTMENT (OUTPATIENT)
Dept: UROGYNECOLOGY | Facility: CLINIC | Age: 44
End: 2024-07-11

## 2024-07-11 DIAGNOSIS — R30.0 DYSURIA: ICD-10-CM

## 2024-07-11 PROCEDURE — 81003 URINALYSIS AUTO W/O SCOPE: CPT | Mod: QW

## 2024-07-11 PROCEDURE — 99204 OFFICE O/P NEW MOD 45 MIN: CPT | Mod: 25

## 2024-07-11 PROCEDURE — 99459 PELVIC EXAMINATION: CPT

## 2024-07-11 PROCEDURE — 51701 INSERT BLADDER CATHETER: CPT | Mod: 59

## 2024-07-12 LAB
APPEARANCE: ABNORMAL
BACTERIA: NEGATIVE /HPF
BILIRUBIN URINE: NEGATIVE
BLOOD URINE: NEGATIVE
CAST: 0 /LPF
COLOR: YELLOW
EPITHELIAL CELLS: 0 /HPF
GLUCOSE QUALITATIVE U: NEGATIVE MG/DL
KETONES URINE: NEGATIVE MG/DL
LEUKOCYTE ESTERASE URINE: NEGATIVE
MICROSCOPIC-UA: NORMAL
NITRITE URINE: NEGATIVE
PH URINE: 7.5
PROTEIN URINE: NEGATIVE MG/DL
RED BLOOD CELLS URINE: 0 /HPF
SPECIFIC GRAVITY URINE: 1.02
UROBILINOGEN URINE: 0.2 MG/DL
WHITE BLOOD CELLS URINE: 0 /HPF

## 2024-07-15 LAB — BACTERIA UR CULT: NORMAL

## 2024-09-09 ENCOUNTER — APPOINTMENT (OUTPATIENT)
Dept: UROGYNECOLOGY | Facility: CLINIC | Age: 44
End: 2024-09-09

## 2024-09-09 DIAGNOSIS — N81.10 CYSTOCELE, UNSPECIFIED: ICD-10-CM

## 2024-09-09 DIAGNOSIS — N39.3 STRESS INCONTINENCE (FEMALE) (MALE): ICD-10-CM

## 2024-09-09 LAB
BILIRUBIN URINE: NEGATIVE
BLOOD URINE: NORMAL
GLUCOSE QUALITATIVE U: NEGATIVE
KETONES URINE: NEGATIVE
LEUKOCYTE ESTERASE URINE: NEGATIVE
NITRITE URINE: NEGATIVE
PH URINE: 7
PROTEIN URINE: NEGATIVE
SPECIFIC GRAVITY URINE: 1.01
UROBILINOGEN URINE: 0.2

## 2024-09-09 PROCEDURE — 51797 INTRAABDOMINAL PRESSURE TEST: CPT

## 2024-09-09 PROCEDURE — 51741 ELECTRO-UROFLOWMETRY FIRST: CPT

## 2024-09-09 PROCEDURE — 51728 CYSTOMETROGRAM W/VP: CPT

## 2024-09-09 PROCEDURE — 51784 ANAL/URINARY MUSCLE STUDY: CPT

## 2024-09-10 LAB
APPEARANCE: CLEAR
BACTERIA: NEGATIVE /HPF
BILIRUBIN URINE: NEGATIVE
BLOOD URINE: NEGATIVE
CAST: 0 /LPF
COLOR: YELLOW
EPITHELIAL CELLS: 2 /HPF
GLUCOSE QUALITATIVE U: NEGATIVE MG/DL
KETONES URINE: NEGATIVE MG/DL
LEUKOCYTE ESTERASE URINE: NEGATIVE
MICROSCOPIC-UA: NORMAL
NITRITE URINE: NEGATIVE
PH URINE: 7
PROTEIN URINE: NEGATIVE MG/DL
RED BLOOD CELLS URINE: 0 /HPF
SPECIFIC GRAVITY URINE: 1.01
UROBILINOGEN URINE: 0.2 MG/DL
WHITE BLOOD CELLS URINE: 0 /HPF

## 2024-09-11 LAB — BACTERIA UR CULT: NORMAL

## 2024-09-19 ENCOUNTER — APPOINTMENT (OUTPATIENT)
Dept: UROGYNECOLOGY | Facility: CLINIC | Age: 44
End: 2024-09-19

## 2024-09-19 PROCEDURE — 99214 OFFICE O/P EST MOD 30 MIN: CPT

## 2024-09-19 NOTE — HISTORY OF PRESENT ILLNESS
[FreeTextEntry1] : Patient is a 44 year old Bahraini speaking multipara who is presenting with symptoms of worsening vaginal bulge, postvoid dribbling as well as a stress urinary incontinence for past 2 years.  Patient was seen in the in July 2024 for initial consultation.  During that visit, on exam, she was noted to have stage II pelvic organ prolapse involving all 3 vaginal compartments with wide genital hiatus measuring 7.5 cm, negative cough stress test and hypermobile urethra. Potential contributing factors include recent weight gain and lifestyle involving heavy lifting. Patient has completed her childbearing. She currently has Mirena IUD in place since past 1 year for treatment of menorrhagia.   Patient presented for urodynamic testing on 9/9/2024.  Her urodynamic test findings include an inconclusive uroflow due to small voided volume of 25 cc, postvoid residual of 10 cc, max flow rate of 5 and flow time of 10 seconds; her complex cystogram showed normal sensation, normal cystometric capacity of 333 cc, normal compliance, absence of detrusor overactivity and presence of stress urinary incontinence starting at filled volume of 200 cc with cough and Valsalva; she voided 385 cc for pressure voiding study with max flow rate of 15, detrusor pressure at max flow rate of 12 cm of water, normal continuous configuration of uroflow and detrusor contraction as the mechanism of void I reviewed patient's most recent ultrasound from July 2023 which showed a 8 cm fibroid uterus with a intramural fibroid measuring 3 cm.  ID IUD was noted to be in normal place.  A 1.5 cm right sided paraovarian cyst was noted.  Both ovaries were normal in appearance Patient's urinalysis and urine culture from September 2024 were negative. She presents today with her adult daughter Warren.  Daughter states that patient has booked her surgery in February 2024.  They would like to move the surgery up if an earlier date becomes available

## 2024-09-19 NOTE — DISCUSSION/SUMMARY
[FreeTextEntry1] : Discussed above findings with the patient and her daughter.  Russian interpretation was provided by patient's daughter per her preference .  Discussed with her that urodynamic testing is consistent with presence of stress urinary incontinence.  Patient also was endorses leaking urine with the today activities on daily basis.  I used computer software to explain the procedure of sling placement.  Postoperative restrictions, recovery time, complications the procedure including macerated complications etc. were reviewed after detailed discussion, patient would like to proceed with placement of sling at the time of prolapse surgery  I again reviewed pros and cons of vaginal versus abdominal reconstructive procedure for prolapse.  Efficacy of vaginal hysterectomy with uterosacral ligament suspension with anterior posterior repair versus supracervical hysterectomy with bilateral salpingectomy and sacrocolpopexy hysterectomy was reviewed.  After detailed discussion, patient would like to proceed with robot-assisted supracervical hysterectomy with bilateral salpingectomy and sacrocolpopexy as well as mid urethral sling placement.  Postoperative restrictions, recovery time, time off from work etc. was reviewed.  Brochure regarding the procedure was provided in Russian language.  Patient was encouraged to follow-up in the office prior to the surgery if she has any questions or concerns

## 2024-09-19 NOTE — DISCUSSION/SUMMARY
[FreeTextEntry1] : Discussed above findings with the patient and her daughter.  [place] interpretation was provided by patient's daughter per her preference .  Discussed with her that urodynamic testing is consistent with presence of stress urinary incontinence.  Patient also was endorses leaking urine with the today activities on daily basis.  I used computer software to explain the procedure of sling placement.  Postoperative restrictions, recovery time, complications the procedure including macerated complications etc. were reviewed after detailed discussion, patient would like to proceed with placement of sling at the time of prolapse surgery  I again reviewed pros and cons of vaginal versus abdominal reconstructive procedure for prolapse.  Efficacy of vaginal hysterectomy with uterosacral ligament suspension with anterior posterior repair versus supracervical hysterectomy with bilateral salpingectomy and sacrocolpopexy hysterectomy was reviewed.  After detailed discussion, patient would like to proceed with robot-assisted supracervical hysterectomy with bilateral salpingectomy and sacrocolpopexy as well as mid urethral sling placement.  Postoperative restrictions, recovery time, time off from work etc. was reviewed.  Brochure regarding the procedure was provided in [place] language.  Patient was encouraged to follow-up in the office prior to the surgery if she has any questions or concerns

## 2024-09-19 NOTE — HISTORY OF PRESENT ILLNESS
[FreeTextEntry1] : Patient is a 44 year old Citizen of [place] speaking multipara who is presenting with symptoms of worsening vaginal bulge, postvoid dribbling as well as a stress urinary incontinence for past 2 years.  Patient was seen in the in July 2024 for initial consultation.  During that visit, on exam, she was noted to have stage II pelvic organ prolapse involving all 3 vaginal compartments with wide genital hiatus measuring 7.5 cm, negative cough stress test and hypermobile urethra. Potential contributing factors include recent weight gain and lifestyle involving heavy lifting. Patient has completed her childbearing. She currently has Mirena IUD in place since past 1 year for treatment of menorrhagia.   Patient presented for urodynamic testing on 9/9/2024.  Her urodynamic test findings include an inconclusive uroflow due to small voided volume of 25 cc, postvoid residual of 10 cc, max flow rate of 5 and flow time of 10 seconds; her complex cystogram showed normal sensation, normal cystometric capacity of 333 cc, normal compliance, absence of detrusor overactivity and presence of stress urinary incontinence starting at filled volume of 200 cc with cough and Valsalva; she voided 385 cc for pressure voiding study with max flow rate of 15, detrusor pressure at max flow rate of 12 cm of water, normal continuous configuration of uroflow and detrusor contraction as the mechanism of void I reviewed patient's most recent ultrasound from July 2023 which showed a 8 cm fibroid uterus with a intramural fibroid measuring 3 cm.  ID IUD was noted to be in normal place.  A 1.5 cm right sided paraovarian cyst was noted.  Both ovaries were normal in appearance Patient's urinalysis and urine culture from September 2024 were negative. She presents today with her adult daughter Warren.  Daughter states that patient has booked her surgery in February 2024.  They would like to move the surgery up if an earlier date becomes available

## 2024-09-19 NOTE — ASSESSMENT
[FreeTextEntry1] : The above test findings are consistent with normal sensation, normal compliance, normal cystometric capacity, presence of stress urinary incontinence starting at filled volume of 200 cc with prolapse reduction, absence of detrusor overactivity and absence of voiding dysfunction.  Patient appears to be a good candidate for retropubic mid urethral sling placement at the time of prolapse surgery.

## 2024-09-20 LAB
APPEARANCE: CLEAR
BACTERIA: NEGATIVE /HPF
BILIRUBIN URINE: NEGATIVE
BLOOD URINE: NEGATIVE
CAST: 0 /LPF
COLOR: YELLOW
EPITHELIAL CELLS: 1 /HPF
GLUCOSE QUALITATIVE U: NEGATIVE MG/DL
KETONES URINE: NEGATIVE MG/DL
LEUKOCYTE ESTERASE URINE: NEGATIVE
MICROSCOPIC-UA: NORMAL
NITRITE URINE: NEGATIVE
PH URINE: 6
PROTEIN URINE: NEGATIVE MG/DL
RED BLOOD CELLS URINE: 0 /HPF
SPECIFIC GRAVITY URINE: 1.02
UROBILINOGEN URINE: 0.2 MG/DL
WHITE BLOOD CELLS URINE: 0 /HPF

## 2024-09-23 LAB — BACTERIA UR CULT: NORMAL

## 2024-12-06 ENCOUNTER — APPOINTMENT (OUTPATIENT)
Dept: CARDIOLOGY | Facility: CLINIC | Age: 44
End: 2024-12-06
Payer: COMMERCIAL

## 2024-12-06 VITALS
HEART RATE: 62 BPM | OXYGEN SATURATION: 98 % | WEIGHT: 169 LBS | SYSTOLIC BLOOD PRESSURE: 112 MMHG | DIASTOLIC BLOOD PRESSURE: 72 MMHG | BODY MASS INDEX: 33.01 KG/M2

## 2024-12-06 DIAGNOSIS — R07.89 OTHER CHEST PAIN: ICD-10-CM

## 2024-12-06 DIAGNOSIS — E66.9 OBESITY, UNSPECIFIED: ICD-10-CM

## 2024-12-06 DIAGNOSIS — R94.31 ABNORMAL ELECTROCARDIOGRAM [ECG] [EKG]: ICD-10-CM

## 2024-12-06 DIAGNOSIS — Z78.9 OTHER SPECIFIED HEALTH STATUS: ICD-10-CM

## 2024-12-06 PROCEDURE — 99214 OFFICE O/P EST MOD 30 MIN: CPT

## 2025-01-10 ENCOUNTER — APPOINTMENT (OUTPATIENT)
Dept: UROGYNECOLOGY | Facility: CLINIC | Age: 45
End: 2025-01-10

## 2025-01-10 ENCOUNTER — APPOINTMENT (OUTPATIENT)
Dept: CARDIOLOGY | Facility: CLINIC | Age: 45
End: 2025-01-10
Payer: COMMERCIAL

## 2025-01-10 VITALS
SYSTOLIC BLOOD PRESSURE: 110 MMHG | HEART RATE: 87 BPM | BODY MASS INDEX: 33.59 KG/M2 | DIASTOLIC BLOOD PRESSURE: 68 MMHG | OXYGEN SATURATION: 99 % | WEIGHT: 172 LBS

## 2025-01-10 DIAGNOSIS — Z01.810 ENCOUNTER FOR PREPROCEDURAL CARDIOVASCULAR EXAMINATION: ICD-10-CM

## 2025-01-10 DIAGNOSIS — R94.31 ABNORMAL ELECTROCARDIOGRAM [ECG] [EKG]: ICD-10-CM

## 2025-01-10 PROCEDURE — 99214 OFFICE O/P EST MOD 30 MIN: CPT

## 2025-01-22 ENCOUNTER — RESULT REVIEW (OUTPATIENT)
Age: 45
End: 2025-01-22

## 2025-01-22 ENCOUNTER — APPOINTMENT (OUTPATIENT)
Dept: UROGYNECOLOGY | Facility: CLINIC | Age: 45
End: 2025-01-22

## 2025-01-22 PROCEDURE — 57288 REPAIR BLADDER DEFECT: CPT

## 2025-01-22 PROCEDURE — 57425 LAPAROSCOPY SURG COLPOPEXY: CPT

## 2025-01-22 PROCEDURE — 58542 LSH W/T/O UT 250 G OR LESS: CPT

## 2025-01-24 ENCOUNTER — NON-APPOINTMENT (OUTPATIENT)
Age: 45
End: 2025-01-24

## 2025-01-31 ENCOUNTER — NON-APPOINTMENT (OUTPATIENT)
Age: 45
End: 2025-01-31

## 2025-02-06 ENCOUNTER — APPOINTMENT (OUTPATIENT)
Dept: UROGYNECOLOGY | Facility: CLINIC | Age: 45
End: 2025-02-06

## 2025-02-06 DIAGNOSIS — Z98.890 OTHER SPECIFIED POSTPROCEDURAL STATES: ICD-10-CM

## 2025-02-06 DIAGNOSIS — R35.0 FREQUENCY OF MICTURITION: ICD-10-CM

## 2025-02-06 PROCEDURE — 81003 URINALYSIS AUTO W/O SCOPE: CPT | Mod: QW

## 2025-02-06 PROCEDURE — 51701 INSERT BLADDER CATHETER: CPT | Mod: NC

## 2025-02-06 PROCEDURE — 99024 POSTOP FOLLOW-UP VISIT: CPT

## 2025-02-07 LAB
APPEARANCE: ABNORMAL
BACTERIA: NEGATIVE /HPF
BILIRUBIN URINE: NEGATIVE
BLOOD URINE: NEGATIVE
CAST: 0 /LPF
COLOR: YELLOW
EPITHELIAL CELLS: 2 /HPF
GLUCOSE QUALITATIVE U: NEGATIVE MG/DL
KETONES URINE: NEGATIVE MG/DL
LEUKOCYTE ESTERASE URINE: NEGATIVE
MICROSCOPIC-UA: NORMAL
NITRITE URINE: NEGATIVE
PH URINE: 7.5
PROTEIN URINE: NORMAL MG/DL
RED BLOOD CELLS URINE: 1 /HPF
SPECIFIC GRAVITY URINE: 1.02
UROBILINOGEN URINE: 0.2 MG/DL
WHITE BLOOD CELLS URINE: 0 /HPF

## 2025-02-10 LAB — BACTERIA UR CULT: NORMAL

## 2025-02-12 ENCOUNTER — APPOINTMENT (OUTPATIENT)
Dept: CARDIOLOGY | Facility: CLINIC | Age: 45
End: 2025-02-12

## 2025-02-25 ENCOUNTER — APPOINTMENT (OUTPATIENT)
Dept: CARDIOLOGY | Facility: CLINIC | Age: 45
End: 2025-02-25

## 2025-03-28 ENCOUNTER — APPOINTMENT (OUTPATIENT)
Age: 45
End: 2025-03-28

## 2025-03-28 VITALS
DIASTOLIC BLOOD PRESSURE: 73 MMHG | HEART RATE: 67 BPM | SYSTOLIC BLOOD PRESSURE: 113 MMHG | BODY MASS INDEX: 33.38 KG/M2 | TEMPERATURE: 97.6 F | WEIGHT: 170 LBS | HEIGHT: 60 IN

## 2025-03-28 LAB
BILIRUB UR QL STRIP: NORMAL
CLARITY UR: CLEAR
GLUCOSE UR-MCNC: NORMAL
HCG UR QL: 0.2 EU/DL
HGB UR QL STRIP.AUTO: NORMAL
KETONES UR-MCNC: NORMAL
LEUKOCYTE ESTERASE UR QL STRIP: NORMAL
NITRITE UR QL STRIP: NORMAL
PH UR STRIP: 7
PROT UR STRIP-MCNC: NORMAL
SP GR UR STRIP: 1.01

## 2025-03-28 PROCEDURE — 99024 POSTOP FOLLOW-UP VISIT: CPT

## 2025-03-28 PROCEDURE — 81003 URINALYSIS AUTO W/O SCOPE: CPT | Mod: QW

## 2025-03-28 PROCEDURE — 51798 US URINE CAPACITY MEASURE: CPT

## 2025-03-31 NOTE — REVIEW OF SYSTEMS
1mg versed and 50mcg fent given. Dressing CDI. PT stable throughout procedure [As Noted in HPI] : as noted in HPI